# Patient Record
Sex: MALE | Race: WHITE | NOT HISPANIC OR LATINO | URBAN - METROPOLITAN AREA
[De-identification: names, ages, dates, MRNs, and addresses within clinical notes are randomized per-mention and may not be internally consistent; named-entity substitution may affect disease eponyms.]

---
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
## 2023-03-24 ENCOUNTER — TELEPHONE (OUTPATIENT)
Dept: SPORTS MEDICINE | Facility: CLINIC | Age: 18
End: 2023-03-24
Payer: MEDICAID

## 2023-03-24 ENCOUNTER — HOSPITAL ENCOUNTER (OUTPATIENT)
Dept: RADIOLOGY | Facility: HOSPITAL | Age: 18
Discharge: HOME | End: 2023-03-24
Attending: ORTHOPAEDIC SURGERY
Payer: MEDICAID

## 2023-03-24 DIAGNOSIS — M79.675 GREAT TOE PAIN, LEFT: ICD-10-CM

## 2023-03-24 DIAGNOSIS — M79.675 GREAT TOE PAIN, LEFT: Primary | ICD-10-CM

## 2023-09-18 DIAGNOSIS — Z00.00 ROUTINE GENERAL MEDICAL EXAMINATION AT A HEALTH CARE FACILITY: Primary | ICD-10-CM

## 2024-05-09 ENCOUNTER — TELEPHONE (OUTPATIENT)
Dept: PAIN MEDICINE | Facility: CLINIC | Age: 19
End: 2024-05-09

## 2024-05-09 PROBLEM — Q12.0 CONGENITAL CATARACT OF BOTH EYES: Status: ACTIVE | Noted: 2017-12-18

## 2024-05-09 NOTE — TELEPHONE ENCOUNTER
----- Message from Lesley Dotson RN sent at 5/9/2024 11:38 AM CDT -----  Regarding: [facility] ER visit f/u 5/8/2024 Dx: Lumbar strain, mild dextroscoliosis  Please call patient to schedule the next available Medicaid appointment for further evaluation/treatment of a lumbar strain, mild dextroscoliosis.     Thanks,    Lesley Dotson RN, BSN  ED Navigator/Case Management  [phone]

## 2024-05-28 PROBLEM — S39.012A STRAIN OF LUMBAR PARASPINAL MUSCLE, INITIAL ENCOUNTER: Status: ACTIVE | Noted: 2024-05-28
